# Patient Record
Sex: FEMALE | Race: WHITE | Employment: FULL TIME | URBAN - METROPOLITAN AREA
[De-identification: names, ages, dates, MRNs, and addresses within clinical notes are randomized per-mention and may not be internally consistent; named-entity substitution may affect disease eponyms.]

---

## 2019-07-11 ENCOUNTER — HOSPITAL ENCOUNTER (OUTPATIENT)
Dept: PHYSICAL THERAPY | Age: 44
Discharge: HOME | End: 2019-07-11
Payer: COMMERCIAL

## 2019-07-11 PROCEDURE — 97110 THERAPEUTIC EXERCISES: CPT

## 2019-07-11 PROCEDURE — 97162 PT EVAL MOD COMPLEX 30 MIN: CPT

## 2019-07-11 NOTE — PROGRESS NOTES
PHYSICAL THERAPY - DAILY TREATMENT NOTE    Patient Name: Mala Charles        Date: 2019  : 1975   YES Patient  Verified  Visit #:     Insurance: Payor: Crystal Stevensoner / Plan: [address] [place] [address] / Product Type: PPO /      In time: 800 Out time: 900   Total Treatment Time: 60     Medicare Time /BCBS Tracking (below)   Total Timed Codes (min):  10 1:1 Treatment Time:  60     TREATMENT AREA =  Lumbago [M54.5]    SUBJECTIVE  Pain Level (on 0 to 10 scale):  5  / 10   Medication Changes/New allergies or changes in medical history, any new surgeries or procedures? NO    If yes, update Summary List   Subjective Functional Status/Changes:  []  No changes reported     Pt initial eval today , see note for details          OBJECTIVE  Modalities Rationale:     decrease inflammation and decrease pain to improve patient's ability to sit   min [] Estim, type/location:                                      []  att     []  unatt     []  w/US     []  w/ice    []  w/heat    min []  Mechanical Traction: type/lbs                   []  pro   []  sup   []  int   []  cont    []  before manual    []  after manual    min []  Ultrasound, settings/location:      min []  Iontophoresis w/ dexamethasone, location:                                               []  take home patch       []  in clinic   5 min [x]  Ice     []  Heat    location/position: Low back in prone    min []  Vasopneumatic Device, press/temp:     min []  Other:    [] Skin assessment post-treatment (if applicable):    []  intact    []  redness- no adverse reaction     []redness  adverse reaction:        10 min Therapeutic Exercise:  [x]  See flow sheet   Rationale:      increase ROM and increase strength to improve the patients ability to sit, bend, and lift        min Patient Education:  YES  Reviewed HEP   []  Progressed/Changed HEP based on:        Other Objective/Functional Measures:    Pt issued initial HEP     Post Treatment Pain Level (on 0 to 10) scale:   5  / 10     ASSESSMENT  Assessment/Changes in Function:     Pt was instructed in self MET to correct ilial rotation and hip and core stability program to improve dynamic postural support. Pt required vc and tc for all new exercises and was cautioned to modify exercises to tolerance. Pt verbalized understanding. []  See Progress Note/Recertification   Patient will continue to benefit from skilled PT services to modify and progress therapeutic interventions, address functional mobility deficits, address ROM deficits, address strength deficits, analyze and address soft tissue restrictions, analyze and cue movement patterns, analyze and modify body mechanics/ergonomics, assess and modify postural abnormalities, address imbalance/dizziness and instruct in home and community integration to attain remaining goals. Progress toward goals / Updated goals:  No progress as initial eval was today    · Short Term Goals: To be accomplished in 3  weeks:  1) Patient performing daily HEP. 2) Patient able to maintain SI mobility or self correct to increase ability to sit and bend for work activities. 3) Patient will report 50% improvement in ability to drive for work. 4) Pt will report pain at 0/10 to perform HEP  · Long Term Goals: To be accomplished in 6 weeks:  1) Patient  independent  with HEP and able to demo proper body mechanics for floor to chest lifting. 2) Patient will increase CATRACHITA hip strength to 4+/5 to increase ability to squat for proper lifting. 3) Increase FOTO to 60  indicating improved function and quality of life. PLAN  []  Upgrade activities as tolerated YES Continue plan of care   []  Discharge due to :    []  Other:      Therapist: Sukumar Armijo    Date: 7/11/2019 Time: 8:04 AM     No future appointments.

## 2019-07-11 NOTE — PROGRESS NOTES
[address] PHYSICAL THERAPY AT [facility] [address], [address], [address] - Phone: [phone]  Fax: [phone] / 0992 [facility]  Patient Name: Mala Charles : 1975   Medical   Diagnosis: Lumbago [M54.5] Treatment Diagnosis: Low back pain   Onset Date: 2019     Referral Source: Graciela Cooper Start of Care [facility]): 2019   Prior Hospitalization: See medical history Provider #: [id number]   Prior Level of Function: WFL   Comorbidities: Thyroid problems   Medications: Verified on Patient Summary List   The Plan of Care and following information is based on the information from the initial evaluation.   ==================================================================================  Assessment / key information: Patient  is a 41 y.o. yo female who presents to [facility] with diagnosis of Lumbago [M54.5]. Pt reports that her back began hurting around 21 after driving. It was just a dull ache that has progressively gotten worse. Now when she goes to sit it pulls in her back and she gets intermittent sharp pains. The pain at its worst is an 8/10 and gets worse with prolonged sitting, bending, lifting, and driving. 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 At its best the pain is a 2/10 and walking, changing position, and advil all seem to help. The pt reports that she has had mild previous injuries to her back i.e. muscle strains, but nothing significant and nothing like this. Pt currently works full time and lives in a single level home with her  and is safe. Pt reports thyroid problems and denies any other health problem. Spinal AROM WNL grossly with pain at endrange, most severe being in forward bending.    AROM  Hip  Flex L= 60/80 R= 55 /80 painful  Ext L= 0/20  R= 0/20    MMT  Flex L= 3+/5 R= 3+/5 ( painful)    Ext L= 3/5  R= 3 /5  Abd 3/5  R= 3 /5  Positive Fort[place] sign on R, Positive compression test, and R ilial posterior rotation/Lilial anteiror rotation. She also reported reproduction of pain with PA pressure from L2-5 and over R SI jt. Pt presents with signs and sx consistent with dx likely related to mild disc injury and SI jt dysfunction. Pt presents primarily with dec hip and core stability as well as dec LE ROM which are likely contributing to Pt sx. Patient scored 30 on FOTO indicating decreased functional status and quality of life. Functional limitations include dec tolerance for sitting, bending, lifitng and work activities .   Patient can benefit from skilled PT interventions to improve said deficits and for education on posture, body mechanics and lifting technique to  facilitate ADLs & overall functional status/quality of life.    ==================================================================================  Eval Complexity: History: MEDIUM  Complexity : 1-2 comorbidities / personal factors will impact the outcome/ POC Exam:MEDIUM Complexity : 3 Standardized tests and measures addressing body structure, function, activity limitation and / or participation in recreation  Presentation: MEDIUM Complexity : Evolving with changing characteristics  Clinical Decision Making:MEDIUM Complexity : FOTO score of 26-74Overall Complexity:MEDIUM  Problem List: pain affecting function, decrease ROM, decrease strength, edema affecting function, impaired gait/ balance, decrease ADL/ functional abilitiies, decrease activity tolerance, decrease flexibility/ joint mobility and decrease transfer abilities   Treatment Plan may include any combination of the following: Therapeutic exercise, Therapeutic activities, Neuromuscular re-education, Physical agent/modality, Gait/balance training, Manual therapy, Aquatic therapy, Patient education, Self Care training, Functional mobility training, Home safety training and Stair training  Patient / Family readiness to learn indicated by: asking questions  Persons(s) to be included in education: patient (P)  Barriers to Learning/Limitations: None  Measures taken:    Patient Goal (s): Dec pain, \" no pain or worsening\"   Patient self reported health status:good  Rehabilitation Potential: good   Short Term Goals: To be accomplished in 3  weeks:  1) Patient performing daily HEP. 2) Patient able to maintain SI mobility or self correct to increase ability to sit and bend for work activities. 3) Patient will report 50% improvement in ability to drive for work. 4) Pt will report pain at 0/10 to perform HEP   Long Term Goals: To be accomplished in 6 weeks:  1) Patient  independent  with HEP and able to demo proper body mechanics for floor to chest lifting. 2) Patient will increase CATRACHITA hip strength to 4+/5 to increase ability to squat for proper lifting. 3) Increase FOTO to 60  indicating improved function and quality of life. Frequency / Duration:   Patient to be seen  2-3  times per week for 6  weeks:  Patient / Caregiver education and instruction: exercises      Therapist Signature: Zana Cardoza Date: 3/11/9998   Certification Period: 7/11/19-10/11/19 Time: 8:04 AM   ==================================================================================  I certify that the above Physical Therapy Services are being furnished while the patient is under my care. I agree with the treatment plan and certify that this therapy is necessary. Physician Signature:        Date:       Time:     Please sign and return to [facility] or you may fax the signed copy to [phone]. Thank you.

## 2019-07-15 ENCOUNTER — HOSPITAL ENCOUNTER (OUTPATIENT)
Dept: PHYSICAL THERAPY | Age: 44
Discharge: HOME | End: 2019-07-15
Payer: COMMERCIAL

## 2019-07-15 PROCEDURE — 97140 MANUAL THERAPY 1/> REGIONS: CPT

## 2019-07-15 PROCEDURE — 97014 ELECTRIC STIMULATION THERAPY: CPT

## 2019-07-15 PROCEDURE — 97110 THERAPEUTIC EXERCISES: CPT

## 2019-07-15 NOTE — PROGRESS NOTES
PHYSICAL THERAPY - DAILY TREATMENT NOTE    Patient Name: Fortunato Ramos        Date: 7/15/2019  : 1975   YES Patient  Verified  Visit #:   2     Insurance: Payor: Katie Wilder / Plan: [address] [place] [address] / Product Type: PPO /      In time: 2:20 pm Out time: 3:21 pm   Total Treatment Time: 41     Medicare Time /BCBS Tracking (below)   Total Timed Codes (min):  31 1:1 Treatment Time:  31     TREATMENT AREA =  Lumbago [M54.5]    SUBJECTIVE  Pain Level (on 0 to 10 scale): 2.5  / 10   Medication Changes/New allergies or changes in medical history, any new surgeries or procedures? NO    If yes, update Summary List   Subjective Functional Status/Changes:  []  No changes reported     Pt reports the pain has improved over the weekend.  Doing HEP daily which helps to decrease pain       OBJECTIVE  Modalities Rationale:     decrease edema, decrease inflammation, decrease pain and increase tissue extensibility to improve patient's ability to perform prolonged sitting  10 min [x] Estim, type/location: Low back, post hip IFC                                     []  att     [x]  unatt     []  w/US     [x]  w/ice    []  w/heat    min []  Mechanical Traction: type/lbs                   []  pro   []  sup   []  int   []  cont    []  before manual    []  after manual    min []  Ultrasound, settings/location:      min []  Iontophoresis w/ dexamethasone, location:                                               []  take home patch       []  in clinic    min []  Ice     []  Heat    location/position:     min []  Vasopneumatic Device, press/temp:     min []  Other:    [x] Skin assessment post-treatment (if applicable):    [x]  intact    []  redness- no adverse reaction     []redness  adverse reaction:        13 min Therapeutic Exercise:  [x]  See flow sheet   Rationale:      increase ROM and increase strength to improve the patients ability to perform prolonged sitting    10  min Manual Therapy: Corrected right anterior innominate with MET; prone STM/DTM to lower lumbar paraspinals; right piriformis release   Rationale:      decrease pain, increase ROM, increase tissue extensibility and decrease trigger points to improve patient's ability to perform prolonged sitting    8 min Therapeutic Activity: Pt education in proper neutral spine posture, use of towel roll in sitting and sleeping postures, log roll body mechanics, BET sit to stand, sit hip hinge technique, activity modification for pain management. Rationale:    increase ROM and increase strength to improve the patients ability to perform prolonged sitting             min Patient Education:  YES  Reviewed HEP   []  Progressed/Changed HEP based on: Other Objective/Functional Measures: Add IFC with with ice,   hip add, supine march with core, standing trunk exercise     Post Treatment Pain Level (on 0 to 10) scale:   2 / 10     ASSESSMENT  Assessment/Changes in Function:   Advanced core stabilization per flow sheet to address decreased strength and to improve SI mobility. Pt education in posture, body mechanics, activity modification for pain management, self SI correction. Pt demonstrating good knowledge of initial HEP after review. Add IFC with ice to address increased mm tone/pain. []  See Progress Note/Recertification   Patient will continue to benefit from skilled PT services to modify and progress therapeutic interventions, address functional mobility deficits, address ROM deficits, address strength deficits, analyze and cue movement patterns, analyze and modify body mechanics/ergonomics and instruct in home and community integration to attain remaining goals. Progress toward goals / Updated goals:    First visit from initial evaluation.  Progressed treatment per plan of care     PLAN  []  Upgrade activities as tolerated YES Continue plan of care   []  Discharge due to :    []  Other:      Therapist: Kim Jackson PTA    Date: 7/15/2019 Time: 3:21  PM     Future Appointments   Date Time Provider Jabari Jung   7/18/2019  7:30 AM [address] PT KIERAN 1 [facility] [address]   7/23/2019  7:30 AM Gracia Torres, PTA [facility] [address]   7/25/2019  7:30 AM [address] PT KIERAN 1 [facility] [address]   7/30/2019  7:30 AM Gracia Torres PTA [facility] [address]   8/1/2019  7:30 AM [address] PT KIERAN 1 [facility] [address]   8/6/2019  7:30 AM [address] PT KIERAN 1 [facility] [address]   8/8/2019  7:30 AM [address] PT KIERAN 1 [facility] [address]   8/13/2019  7:30 AM Gracia Torres PTA [facility] [address]   8/15/2019  7:30 AM Gracia Torres PTA [facility] [address]   8/20/2019  7:30 AM Gracia Torres PTA [facility] [address]   8/22/2019  7:30 AM Gracia Torres PTA [facility] [address]   8/27/2019  7:30 AM Gracia Torres PTA [facility] [address]   8/29/2019  7:30 AM Gracia Torres, PTA [facility] [address]

## 2019-07-18 ENCOUNTER — HOSPITAL ENCOUNTER (OUTPATIENT)
Dept: PHYSICAL THERAPY | Age: 44
Discharge: HOME | End: 2019-07-18
Payer: COMMERCIAL

## 2019-07-18 PROCEDURE — 97140 MANUAL THERAPY 1/> REGIONS: CPT

## 2019-07-18 PROCEDURE — 97110 THERAPEUTIC EXERCISES: CPT

## 2019-07-18 NOTE — PROGRESS NOTES
PHYSICAL THERAPY - DAILY TREATMENT NOTE    Patient Name: Randa Anglin        Date: 2019  : 1975   YES Patient  Verified  Visit #:   3  of   12  Insurance: Payor: Metchasidy Ends / Plan: [address] [place] [address] / Product Type: PPO /      In time: 732 Out time: 810   Total Treatment Time: 38     Medicare Time /BCBS Tracking (below)   Total Timed Codes (min):  28 1:1 Treatment Time:  28     TREATMENT AREA =  Lumbago [M54.5]    SUBJECTIVE  Pain Level (on 0 to 10 scale):  1  / 10   Medication Changes/New allergies or changes in medical history, any new surgeries or procedures?     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     OBJECTIVE  Modalities Rationale:     decrease pain to improve patient's ability to sit and drive   min [] Estim, type/location:                                      []  att     []  unatt     []  w/US     []  w/ice    []  w/heat    min []  Mechanical Traction: type/lbs                   []  pro   []  sup   []  int   []  cont    []  before manual    []  after manual    min []  Ultrasound, settings/location:      min []  Iontophoresis w/ dexamethasone, location:                                               []  take home patch       []  in clinic   10 min [x]  Ice     []  Heat    location/position: Low back in prone    min []  Vasopneumatic Device, press/temp:     min []  Other:    [] Skin assessment post-treatment (if applicable):    []  intact    []  redness- no adverse reaction     []redness  adverse reaction:        18 min Therapeutic Exercise:  [x]  See flow sheet   Rationale:      increase ROM and increase strength to improve the patients ability to sit and perform work duties     10 min Manual Therapy: Pt performed STM and trigger point release to R multifidus and upper glute near SI jt   Rationale:      decrease pain to improve patient's ability to perform work duties         min Patient Education:  YES  Reviewed HEP   []  Progressed/Changed HEP based on: Other Objective/Functional Measures:    New therex introduced today     Post Treatment Pain Level (on 0 to 10) scale:   0  / 10     ASSESSMENT  Assessment/Changes in Function:     Pt was able to tolerate new exercises without aggravating sx evidencing improving strength and core stability. Pt required vc and demo for all new exercises today. New therex focused on inc glute and abductor strength for improved hip and core stability. Pt declined IFC today. Although it is early, pt is already demonstrating improved sx likely due to good adherence to HEP. []  See Progress Note/Recertification   Patient will continue to benefit from skilled PT services to modify and progress therapeutic interventions, address functional mobility deficits, address ROM deficits, address strength deficits, analyze and address soft tissue restrictions, analyze and cue movement patterns, analyze and modify body mechanics/ergonomics, assess and modify postural abnormalities, address imbalance/dizziness and instruct in home and community integration to attain remaining goals. Progress toward goals / Updated goals: · Short Term Goals: To be accomplished in 3  weeks:  1) Patient performing daily HEP. 2) Patient able to maintain SI mobility or self correct to increase ability to sit and bend for work activities. 3) Patient will report 50% improvement in ability to drive for work. 4) Pt will report pain at 0/10 to perform HEP  · Long Term Goals: To be accomplished in 6 weeks:  1) Patient  independent  with HEP and able to demo proper body mechanics for floor to chest lifting.   2) Patient will increase CATRACHITA hip strength to 4+/5 to increase ability to squat for proper lifting. 3) Increase FOTO to 60  indicating improved function and quality of life.      PLAN  [x]  Upgrade activities as tolerated YES Continue plan of care   []  Discharge due to :    []  Other:      Therapist: Alicia Rodrigues    Date: 7/18/2019 Time: 7:52 AM     Future Appointments   Date Time Provider Jabari Jung   7/23/2019  7:30 AM Mary Repress, [facility]   7/25/2019  7:30 AM Mary Repress, [facility]   7/30/2019  7:30 AM Mary Repress, PTA [facility]   8/1/2019  7:30 AM [facility] PT [address]   8/6/2019  7:30 AM [facility] PT [address]   8/8/2019  7:30 AM [facility] PT [address]   8/13/2019  7:30 AM Mary Repress, [facility]   8/15/2019  7:30 AM Mary Repress, [facility]   8/20/2019  7:30 AM Mary Repress, [facility]   8/22/2019  7:30 AM Mary Repress, [facility]   8/27/2019  7:30 AM Mary Repress, [facility]   8/29/2019  7:30 AM Mary Repress, [facility]

## 2019-07-23 ENCOUNTER — HOSPITAL ENCOUNTER (OUTPATIENT)
Dept: PHYSICAL THERAPY | Age: 44
Discharge: HOME | End: 2019-07-23
Payer: COMMERCIAL

## 2019-07-23 PROCEDURE — 97530 THERAPEUTIC ACTIVITIES: CPT

## 2019-07-23 PROCEDURE — 97110 THERAPEUTIC EXERCISES: CPT

## 2019-07-23 NOTE — PROGRESS NOTES
PHYSICAL THERAPY - DAILY TREATMENT NOTE    Patient Name: Hieu Ballard        Date: 2019  : 1975   YES Patient  Verified  Visit #:     Insurance: Payor: Daisy Infante / Plan: [address] [place] [address] / Product Type: PPO /      In time: 7:27 am Out time: 8:25 am   Total Treatment Time: 58     Medicare Time /BCBS Tracking (below)   Total Timed Codes (min): 48 1:1 Treatment Time: 43     TREATMENT AREA =  Lumbago [M54.5]    SUBJECTIVE  Pain Level (on 0 to 10 scale):  0  / 10   Medication Changes/New allergies or changes in medical history, any new surgeries or procedures? NO    If yes, update Summary List   Subjective Functional Status/Changes:  []  No changes reported     Pt states \"I actually have been feeling really good.          OBJECTIVE  Modalities Rationale:     decrease edema, decrease inflammation, decrease pain and increase tissue extensibility to improve patient's ability to perform bending and lifting   min [] Estim, type/location:                                      []  att     []  unatt     []  w/US     []  w/ice    []  w/heat    min []  Mechanical Traction: type/lbs                   []  pro   []  sup   []  int   []  cont    []  before manual    []  after manual    min []  Ultrasound, settings/location:      min []  Iontophoresis w/ dexamethasone, location:                                               []  take home patch       []  in clinic   10 min [x]  Ice     []  Heat    location/position: Supine low back     min []  Vasopneumatic Device, press/temp:     min []  Other:    [x] Skin assessment post-treatment (if applicable):    [x]  intact    []  redness- no adverse reaction     []redness  adverse reaction:        32 min Therapeutic Exercise:  [x]  See flow sheet   Rationale:      increase ROM and increase strength to improve the patients ability to perform bending and lifting       16 min Therapeutic Activity: Pt education in floor to waist liftig body mechanics with stool ; quarter squat body mechanics, body mechanics in bending and lifting equipment in and out of work truck, in and out of truck   Rationale:    increase ROM, increase strength and increase proprioception to improve the patients ability to perform functional ADLs      With TE,  TA min Patient Education:  YES  Reviewed HEP   [x]  Progressed/Changed HEP based on:  Improving ROM, strength     Other Objective/Functional Measures:  Hold manual secondary to no pain/muscle soreness after exercise  Review liftng body mechanics   Post Treatment Pain Level (on 0 to 10) scale:   1  / 10     ASSESSMENT  Assessment/Changes in Function:   Pt demonstrating improving knowledge and form after review body mechanics with bending and lifting equipment from work truck. Review floor to waist and knee to waist body mechanics. Pt demonstrating improving SI mobility. []  See Progress Note/Recertification   Patient will continue to benefit from skilled PT services to modify and progress therapeutic interventions, address functional mobility deficits, address ROM deficits, address strength deficits, analyze and address soft tissue restrictions and instruct in home and community integration to attain remaining goals. Progress toward goals / Updated goals: · Short Term Goals: To be accomplished in 3  weeks:  1) Patient performing daily HEP.    2) Patient able to maintain SI mobility or self correct to increase ability to sit and bend for work activities.-goal partially met  3) Patient will report 50% improvement in ability to drive for work.   4) Pt will report pain at 0/10 to perform HEP       PLAN  []  Upgrade activities as tolerated YES Continue plan of care   []  Discharge due to :    []  Other:      Therapist: Hua Villar PTA    Date: 7/23/2019 Time: 8: 25 AM     Future Appointments   Date Time Provider Jabari Jung   7/25/2019  7:30 AM Gisela Valentine PTA [facility]   7/30/2019  7:30 AM Gisela Valentine PTA [facility]   8/1/2019  7:30 AM [facility] PT [facility]   8/6/2019  7:30 AM [facility] PT [facility] 1 [facility]   8/8/2019  7:30 AM [facility] PT [facility] 1 [facility]   8/13/2019  7:30 AM [place] Ko, PTA [facility]   8/15/2019  7:30 AM [place] Ko, PTA [facility]   8/20/2019  7:30 AM [place] Ko, PTA [facility]   8/22/2019  7:30 AM [place] Ko, PTA [facility]   8/27/2019  7:30 AM [place] Ko, PTA [facility]   8/29/2019  7:30 AM [place] Ko, PTA [facility]

## 2019-07-25 ENCOUNTER — HOSPITAL ENCOUNTER (OUTPATIENT)
Dept: PHYSICAL THERAPY | Age: 44
Discharge: HOME | End: 2019-07-25
Payer: COMMERCIAL

## 2019-07-25 PROCEDURE — 97140 MANUAL THERAPY 1/> REGIONS: CPT

## 2019-07-25 NOTE — PROGRESS NOTES
PHYSICAL THERAPY - DAILY TREATMENT NOTE    Patient Name: Eleni Carrillo        Date: 2019  : 1975   YES Patient  Verified  Visit #:     Insurance: Payor: Mary Rodriguez / Plan: [address] [place] [address] / Product Type: PPO /      In time: 7:53 am Out time: 8:18 am   Total Treatment Time: 25     Medicare Time /BCBS Tracking (below)   Total Timed Codes (min):  25 1:1 Treatment Time:  10     TREATMENT AREA =  Lumbago [M54.5]    SUBJECTIVE  Pain Level (on 0 to 10 scale): 3 / 10   Medication Changes/New allergies or changes in medical history, any new surgeries or procedures? NO    If yes, update Summary List   Subjective Functional Status/Changes:  []  No changes reported     Pt reports I'm sore today. She worked 10+ hour long days the last 2 days. 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 OBJECTIVE  Modalities Rationale:  N/a       17 min Therapeutic Exercise:  [x]  See flow sheet   Rationale:      increase ROM and increase strength to improve the patients ability to perform bending and lifting     8 min Manual Therapy: Corrected right anterior innominate with MET; review self correction   Rationale:      decrease pain, increase ROM, increase tissue extensibility and decrease trigger points to improve patient's ability to improve tissue mobility in ADLs      With TE min Patient Education:  YES  Reviewed HEP   []  Progressed/Changed HEP based on: Other Objective/Functional Measures:  Trunk AROM: FF: 95%, ext: 90%, RSB: 90%, LSB: 85%, = rotation; = side glides with slight pull with RSide glides  Decreased exercise per flow sheet secondary to pt fatigued and pt time constraints. Post Treatment Pain Level (on 0 to 10) scale:   3  / 10     ASSESSMENT  Assessment/Changes in Function:   Hold manual and modalities secondary to pt time constraints . Add 100's to improve core stabilization strengthening.  Pt demonstrating good form in exercise with VCs     [] See Progress Note/Recertification   Patient will continue to benefit from skilled PT services to modify and progress therapeutic interventions, address functional mobility deficits, address ROM deficits, address strength deficits, analyze and address soft tissue restrictions and instruct in home and community integration to attain remaining goals.    Progress toward goals / Updated goals:  Resume exercise, manual and modalities next visit prn     PLAN  []  Upgrade activities as tolerated YES Continue plan of care   []  Discharge due to :    []  Other:      Therapist: Laura Gandhi PTA    Date: 7/25/2019 Time: 8:18 AM     Future Appointments   Date Time Provider Jabari Jung   7/30/2019  7:30 AM Patel Talbot PTA [facility]   8/1/2019  7:30 AM [facility] PT KIERAN 1 [facility]   8/6/2019  7:30 AM [facility] PT KIERAN 1 [facility]   8/8/2019  7:30 AM [facility] PT KIERAN 1 [facility]   8/13/2019  7:30 AM Patel Talbot PTA [facility]   8/15/2019  7:30 AM Patel Talbot PTA [facility]   8/20/2019  7:30 AM Patel Talbot PTA [facility]   8/22/2019  7:30 AM Patel Talbot PTA [facility]   8/27/2019  7:30 AM Patel Talbot PTA [facility]   8/29/2019  7:30 AM Patel Talbot PTA [facility]

## 2019-07-30 ENCOUNTER — HOSPITAL ENCOUNTER (OUTPATIENT)
Dept: PHYSICAL THERAPY | Age: 44
Discharge: HOME | End: 2019-07-30
Payer: COMMERCIAL

## 2019-07-30 PROCEDURE — 97110 THERAPEUTIC EXERCISES: CPT

## 2019-07-30 NOTE — PROGRESS NOTES
PHYSICAL THERAPY - DAILY TREATMENT NOTE    Patient Name: Jenny Zuleta        Date: 2019  : 1975   YES Patient  Verified  Visit #:     Insurance: Payor: John Jensen / Plan: [address] [place] [address] / Product Type: PPO /      In time: 7:36 am Out time: 8:30 am   Total Treatment Time: 54     Medicare Time /BCBS Tracking (below)   Total Timed Codes (min): 44 1:1 Treatment Time:  38     TREATMENT AREA =  Lumbago [M54.5]    SUBJECTIVE  Pain Level (on 0 to 10 scale): 0  / 10   Medication Changes/New allergies or changes in medical history, any new surgeries or procedures? NO    If yes, update Summary List   Subjective Functional Status/Changes:  []  No changes reported     Pt reports I've been doing pretty well with everything. 0/10 muscle aches from working all day.           OBJECTIVE  Modalities Rationale:     decrease edema, decrease inflammation, decrease pain and increase tissue extensibility to improve patient's ability to perform bending and lifting   min [] Estim, type/location:                                      []  att     []  unatt     []  w/US     []  w/ice    []  w/heat    min []  Mechanical Traction: type/lbs                   []  pro   []  sup   []  int   []  cont    []  before manual    []  after manual    min []  Ultrasound, settings/location:      min []  Iontophoresis w/ dexamethasone, location:                                               []  take home patch       []  in clinic   10 min [x]  Ice     []  Heat    location/position: Prone low back    min []  Vasopneumatic Device, press/temp:     min []  Other:    [x] Skin assessment post-treatment (if applicable):    [x]  intact    []  redness- no adverse reaction     []redness  adverse reaction:        44 min Therapeutic Exercise:  [x]  See flow sheet   Rationale:      increase ROM and increase strength to improve the patients ability to perform bending and lifting           With TE min Patient Education:  [employer] Reviewed HEP   []  Progressed/Changed HEP based on: Other Objective/Functional Measures:    Advanced prone core stabilization: prone alt UE/LE, press ups, tband ext GTB 10x , and 3 way SLR , mini squats, toe taps. Review discharge instructions. Updated written D/C HEP. Post Treatment Pain Level (on 0 to 10) scale:   0 / 10     ASSESSMENT  Assessment/Changes in Function:   See dischrge      [x]  See Progress Note/Recertification   Patient will continue to benefit from skilled PT services to modify and progress therapeutic interventions, address functional mobility deficits, address ROM deficits, address strength deficits, analyze and address soft tissue restrictions, analyze and cue movement patterns and instruct in home and community integration to attain remaining goals.    Progress toward goals / Updated goals:  See discharge     PLAN  []  Upgrade activities as tolerated NO Continue plan of care   [x]  Discharge due to : Pt met /progressing toward goals   []  Other:      Therapist: Michelle Bryson PTA    Date: 7/30/2019 Time: 8:30 AM     Future Appointments   Date Time Provider Jabari Jung   8/1/2019  7:30 AM [facility] PT [facility] 1 [facility]   8/6/2019  7:30 AM [facility] PT [facility] 1 [facility]   8/8/2019  7:30 AM [facility] PT KIERAN 1 [facility]   8/13/2019  7:30 AM Reyna Cates [facility]   8/15/2019  7:30 AM Reyna Cates PTA [facility]   8/20/2019  7:30 AM Reyna Cates, [facility]   8/22/2019  7:30 AM Reyna Cates, [facility]   8/27/2019  7:30 AM Reyna Cates, [facility]   8/29/2019  7:30 AM Reyna Cates, [facility]

## 2019-08-01 ENCOUNTER — APPOINTMENT (OUTPATIENT)
Dept: PHYSICAL THERAPY | Age: 44
End: 2019-08-01

## 2019-08-06 ENCOUNTER — APPOINTMENT (OUTPATIENT)
Dept: PHYSICAL THERAPY | Age: 44
End: 2019-08-06

## 2019-08-08 ENCOUNTER — APPOINTMENT (OUTPATIENT)
Dept: PHYSICAL THERAPY | Age: 44
End: 2019-08-08

## 2019-08-13 ENCOUNTER — APPOINTMENT (OUTPATIENT)
Dept: PHYSICAL THERAPY | Age: 44
End: 2019-08-13

## 2019-08-15 ENCOUNTER — APPOINTMENT (OUTPATIENT)
Dept: PHYSICAL THERAPY | Age: 44
End: 2019-08-15

## 2019-08-20 ENCOUNTER — APPOINTMENT (OUTPATIENT)
Dept: PHYSICAL THERAPY | Age: 44
End: 2019-08-20

## 2019-08-22 ENCOUNTER — APPOINTMENT (OUTPATIENT)
Dept: PHYSICAL THERAPY | Age: 44
End: 2019-08-22

## 2019-08-27 ENCOUNTER — APPOINTMENT (OUTPATIENT)
Dept: PHYSICAL THERAPY | Age: 44
End: 2019-08-27

## 2019-08-29 ENCOUNTER — APPOINTMENT (OUTPATIENT)
Dept: PHYSICAL THERAPY | Age: 44
End: 2019-08-29

## 2019-09-16 NOTE — PROGRESS NOTES
[facility]  [address], [address] - Phone: [phone]  Fax: [phone]:        [x]  PHYSICAL THERAPY              Patient Name: Johnathan Rosario : 1975   Treatment Diagnosis: Lumbago [M54.5] Onset Date: 19   Referral Source: Lety Esparza [facility]): 19   Prior Hospitalization: See medical history Provider #: [id number]   Prior Level of Function: WFL   Comorbidities: Thyroid problems   Visits from [facility]: 6 Missed Visits: 0     Goal/Measure of Progress Goal Met? 1.     Patient performing daily HEP.        Status at last Eval: dependent Current Status: Pt I in HEP yes   2. Patient able to maintain SI mobility or self correct to increase ability to sit and bend for work activities. Status at last Eval: SI hypomobility. Current Status: Pt demonstrating good SI mobility. yes   3. Patient will report 50% improvement in ability to drive for work.      Status at last Eval: na Current Status: 100% yes   4. Pt will report pain at 0/10 to perform HEP   Status at last Eval: 2 to 8/10 Current Status: 0/10 yes       Key Functional Changes/Progress:Patient has progressed well in Physical Therapy, consistently reporting improving ROM, decreasing pain, and increased functional ability. Pt's current pain level: 0/10  Functional improvements are **. Pt is independent with discharge HEP. FOTO: MMT: hip flexion: R/L: ~3+/5, hip abduction: R/L: 4+/5, 3+/5, extension: R/L: ~ 4+/5 . Assessments/Recommendations: Discontinue therapy. Progressing towards or have reached established goals. If you have any questions/comments please contact us directly at [phone]. Thank you for allowing us to assist in the care of your patient. Therapist Signature:  Nicolas Franz, PT Date: 2019   Reporting  Period: 19 to  19 Time: 7:33 AM   NOTE TO PHYSICIAN:  PLEASE COMPLETE THE ORDERS BELOW AND FAX TO   [facility] Physical Therapy: [phone]  If you are unable to process this request in 24 hours please contact our office: [phone]    ___ I have read the above report and request that my patient continue as recommended.   ___ I have read the above report and request that my patient continue therapy with the following changes/special instructions:_________________________________________________________   ___ I have read the above report and request that my patient be discharged from therapy.      Physician Signature:        Date:       Time: